# Patient Record
Sex: FEMALE | URBAN - METROPOLITAN AREA
[De-identification: names, ages, dates, MRNs, and addresses within clinical notes are randomized per-mention and may not be internally consistent; named-entity substitution may affect disease eponyms.]

---

## 2018-04-17 ENCOUNTER — EMERGENCY (EMERGENCY)
Facility: HOSPITAL | Age: 28
LOS: 1 days | Discharge: HOME | End: 2018-04-17
Attending: EMERGENCY MEDICINE
Payer: SELF-PAY

## 2018-04-17 VITALS
TEMPERATURE: 99 F | HEART RATE: 93 BPM | DIASTOLIC BLOOD PRESSURE: 82 MMHG | OXYGEN SATURATION: 99 % | SYSTOLIC BLOOD PRESSURE: 131 MMHG | RESPIRATION RATE: 18 BRPM

## 2018-04-17 PROCEDURE — 99283 EMERGENCY DEPT VISIT LOW MDM: CPT

## 2018-04-17 NOTE — ED PROVIDER NOTE - OBJECTIVE STATEMENT
29yo F w/ no significant pmhx c/o 3 days of urinary frequency, urgency and burning on urination, associated with lower abdominal pressure pain. no fever, chills, nausea, vomiting, constipation. LMP was 04/08 Denies any other symptoms.

## 2018-04-17 NOTE — ED PROVIDER NOTE - PROGRESS NOTE DETAILS
Attending MD [place].  Pt signed out to me in stable condition pending UA, Preg - UTI sxs, vag'l d/c, refused pelvic exam, afebrile, pain suprapubic ** F/u ob/ygn. UA showed UTI, got one dose of Keflex, will be sent home on Keflex for 10days and instructed to follow up with OB/GYN Attending MD [place]:  Pt refusing pelvic exam and aware of need to follow-up with OB/GYN.  Will tx for UTI presently given dysuria, suprapubic pain.  Needs to follow-up with OB/GYN for pelvic exam and assessment of any poss STD's.  Pt aware that lack fo pelvic exam limits completion of exam and limits tx of any possible STD's that may be present concomitantly.  Stable for discharge, A & O x 3 with decisional capacity.  Follow-up as indicated.  Return to ED as needed for fevers, worsened pain, inability to urinate, change of mind re: pelvic exam.

## 2018-04-17 NOTE — ED ADULT NURSE NOTE - OBJECTIVE STATEMENT
27 y/o female, a&o x3, c/o lower abd pn radiating to bilat flank/lower back. Pt reports dysuria and difficulty urinating x 3 days, with blood and "clots" in urine. Denies fevers, chills, headaches, weakness, dizziness, nausea, vomiting, or SOB. Abdomen soft, nontender, nondistended. Resting on stretcher. Advised of plan of care. MD at bedside.

## 2018-04-17 NOTE — ED PROVIDER NOTE - ATTENDING CONTRIBUTION TO CARE
Patient presenting c/o dysuria, lower abdominal pains, frequency, urgency, hematuria.  "Feels like a really bad UTI".  Pain radating to lower back but not flanks.  No fevers chills, no nausea.  Reporting associated vaginal discharge.    On exam patient well appearing, vital signs within normal limits, regular rate and rhythm, non labored respirations, abdomen soft, mild suprapubic tenderness.  Refusing pelvic exam, discussed cannot evaluate abnormal discharge and fully evaluate all possible sources of pain without exam which patient understood, still refusing exam.  No CVA tenderness.    Most symptoms consistent with UTI, no CVA tenderness, not tachycardic or febrile, do not suspect pyelonephritis clinically, will obtain UA/UPreg/UC, re-evaluate, likely DC with ABx.

## 2018-04-18 VITALS
HEART RATE: 68 BPM | SYSTOLIC BLOOD PRESSURE: 127 MMHG | OXYGEN SATURATION: 98 % | RESPIRATION RATE: 18 BRPM | DIASTOLIC BLOOD PRESSURE: 83 MMHG | TEMPERATURE: 99 F

## 2018-04-18 LAB
APPEARANCE UR: ABNORMAL
BILIRUB UR-MCNC: NEGATIVE — SIGNIFICANT CHANGE UP
COLOR SPEC: YELLOW — SIGNIFICANT CHANGE UP
DIFF PNL FLD: ABNORMAL
EPI CELLS # UR: SIGNIFICANT CHANGE UP /HPF
GLUCOSE UR QL: NEGATIVE — SIGNIFICANT CHANGE UP
KETONES UR-MCNC: NEGATIVE — SIGNIFICANT CHANGE UP
LEUKOCYTE ESTERASE UR-ACNC: ABNORMAL
NITRITE UR-MCNC: NEGATIVE — SIGNIFICANT CHANGE UP
PH UR: 6.5 — SIGNIFICANT CHANGE UP (ref 5–8)
PROT UR-MCNC: 100 MG/DL
RBC CASTS # UR COMP ASSIST: >50 /HPF (ref 0–2)
SP GR SPEC: 1.02 — SIGNIFICANT CHANGE UP (ref 1.01–1.02)
UROBILINOGEN FLD QL: NEGATIVE — SIGNIFICANT CHANGE UP
WBC UR QL: >50 /HPF (ref 0–5)

## 2018-04-18 PROCEDURE — 81001 URINALYSIS AUTO W/SCOPE: CPT

## 2018-04-18 PROCEDURE — 87186 SC STD MICRODIL/AGAR DIL: CPT

## 2018-04-18 PROCEDURE — 87086 URINE CULTURE/COLONY COUNT: CPT

## 2018-04-18 PROCEDURE — 99283 EMERGENCY DEPT VISIT LOW MDM: CPT

## 2018-04-18 RX ORDER — CEPHALEXIN 500 MG
500 CAPSULE ORAL ONCE
Qty: 0 | Refills: 0 | Status: COMPLETED | OUTPATIENT
Start: 2018-04-18 | End: 2018-04-18

## 2018-04-18 RX ORDER — CEPHALEXIN 500 MG
1 CAPSULE ORAL
Qty: 21 | Refills: 0 | OUTPATIENT
Start: 2018-04-18 | End: 2018-04-24

## 2018-04-18 RX ADMIN — Medication 500 MILLIGRAM(S): at 01:17

## 2018-04-19 NOTE — ED POST DISCHARGE NOTE - OTHER COMMUNICATION
4/19/18 Pt discharged on Keflex 500mg BIDx7 days. Will await sensitivities -Mirna Andrews PA-C 4/19/18 Pt discharged on Keflex 500mg TIDx7 days. Will await sensitivities -Mirna Andrews PA-C

## 2022-01-25 NOTE — ED ADULT NURSE NOTE - TEMPLATE LIST FOR HEAD TO TOE ASSESSMENT
General Complex Repair And Transposition Flap Text: The defect edges were debeveled with a #15 scalpel blade.  The primary defect was closed partially with a complex linear closure.  Given the location of the remaining defect, shape of the defect and the proximity to free margins a transposition flap was deemed most appropriate for complete closure of the defect.  Using a sterile surgical marker, an appropriate advancement flap was drawn incorporating the defect and placing the expected incisions within the relaxed skin tension lines where possible.    The area thus outlined was incised deep to adipose tissue with a #15 scalpel blade.  The skin margins were undermined to an appropriate distance in all directions utilizing iris scissors.

## 2022-06-06 NOTE — ED PROVIDER NOTE - CPE EDP RESP NORM
normal... Mart-1 - Negative Histology Text: MART-1 staining demonstrates a normal density and pattern of melanocytes along the dermal-epidermal junction. The surgical margins are negative for tumor cells.

## 2024-11-01 NOTE — ED ADULT NURSE NOTE - NS ED NURSE DC PT EDUCATION RESOURCES
Duration Of Freeze Thaw-Cycle (Seconds): 3 Post-Care Instructions: I reviewed with the patient in detail post-care instructions. Patient is to wear sunprotection, and avoid picking at any of the treated lesions. Pt may apply Vaseline to crusted or scabbing areas. Number Of Freeze-Thaw Cycles: 2 freeze-thaw cycles Show Applicator Variable?: Yes Application Tool (Optional): Liquid Nitrogen Sprayer Render Note In Bullet Format When Appropriate: No Detail Level: Detailed Consent: The patient's consent was obtained including but not limited to risks of crusting, scabbing, blistering, scarring, darker or lighter pigmentary change, recurrence, incomplete removal and infection. Yes